# Patient Record
Sex: MALE | Race: WHITE | NOT HISPANIC OR LATINO | Employment: OTHER | URBAN - METROPOLITAN AREA
[De-identification: names, ages, dates, MRNs, and addresses within clinical notes are randomized per-mention and may not be internally consistent; named-entity substitution may affect disease eponyms.]

---

## 2017-06-19 NOTE — PATIENT DISCUSSION
The patient feels that the cataract is significantly impacting daily activities and has elected cataract surgery. The risks, benefits, and alternatives to surgery were discussed. The patient elects to proceed with surgery. Pt elects PCIOL OD Basic goal Louise.

## 2017-07-14 NOTE — PATIENT DISCUSSION
The patient is 1 day status post cataract surgery. The eye has healed well with no signs of infection or inflammation. All surgery associated drops may be stopped. A glasses prescription, if needed, was prescribed.

## 2017-07-14 NOTE — PATIENT DISCUSSION
The patient is 1 month status post cataract surgery. The eye has healed well with no signs of infection or inflammation. All surgery associated drops may be stopped. A glasses prescription, if needed, was prescribed.

## 2017-07-20 NOTE — PATIENT DISCUSSION
The patient is 1 wk status post cataract surgery. The eye has healed well with no signs of infection or inflammation. A glasses prescription, if needed at next visit.

## 2017-08-07 NOTE — PATIENT DISCUSSION
RD/tear warning symptoms reviewed. F&F brochure given. Call immediately if increase in floaters, flashes, veil, blur.

## 2017-11-30 NOTE — PROCEDURE NOTE: SURGICAL
Prior to commencing surgery patient identification, surgical procedure, site, and side were confirmed by Dr. Jorge Vila. Following topical proparacaine anesthesia, the patient was positioned at the YAG laser, a contact lens coupled to the cornea with methylcellulose and an axial posterior capsulotomy performed without complication using 3.3 Mj x 59. Excess methylcellulose was washed from the eye, one drop of Alphagan was instilled and the patient returned to the holding area having tolerated the procedure well and without complication. <br />

## 2018-05-24 NOTE — PROCEDURE NOTE: SURGICAL
Prior to commencing surgery patient identification, surgical procedure, site, and side were confirmed by Dr. Reddy Grace. Following topical proparacaine anesthesia, the patient was positioned at the YAG laser, a contact lens coupled to the cornea with methylcellulose and an axial posterior capsulotomy performed without complication using 3.8 Mj x 76. Excess methylcellulose was washed from the eye, one drop of Alphagan was instilled and the patient returned to the holding area having tolerated the procedure well and without complication. <br />[facility]:[id number]

## 2019-10-03 ENCOUNTER — NEW PATIENT COMPREHENSIVE (OUTPATIENT)
Dept: URBAN - METROPOLITAN AREA CLINIC 32 | Facility: CLINIC | Age: 79
End: 2019-10-03

## 2019-10-03 DIAGNOSIS — H25.013: ICD-10-CM

## 2019-10-03 DIAGNOSIS — H25.13: ICD-10-CM

## 2019-10-03 PROCEDURE — G9903 PT SCRN TBCO ID AS NON USER: HCPCS

## 2019-10-03 PROCEDURE — G8428 CUR MEDS NOT DOCUMENT: HCPCS

## 2019-10-03 PROCEDURE — 92015 DETERMINE REFRACTIVE STATE: CPT

## 2019-10-03 PROCEDURE — 92004 COMPRE OPH EXAM NEW PT 1/>: CPT

## 2019-10-03 PROCEDURE — G8482 FLU IMMUNIZE ORDER/ADMIN: HCPCS

## 2019-10-03 PROCEDURE — 4040F PNEUMOC VAC/ADMIN/RCVD: CPT

## 2019-10-03 PROCEDURE — G8785 BP SCRN NO PERF AT INTERVAL: HCPCS

## 2019-10-03 PROCEDURE — 1036F TOBACCO NON-USER: CPT

## 2019-10-03 ASSESSMENT — KERATOMETRY
OS_K2POWER_DIOPTERS: 43.75
OD_K2POWER_DIOPTERS: 44.5
OD_AXISANGLE2_DEGREES: 93
OS_AXISANGLE2_DEGREES: 50
OD_K1POWER_DIOPTERS: 43.25
OS_AXISANGLE_DEGREES: 140
OD_AXISANGLE_DEGREES: 3
OS_K1POWER_DIOPTERS: 43

## 2019-10-03 ASSESSMENT — VISUAL ACUITY
OS_SC: J16
OD_SC: 20/100
OS_SC: 20/80
OD_SC: J4
OD_BAT: 20/200
OS_BAT: 20/80

## 2019-10-03 ASSESSMENT — TONOMETRY
OD_IOP_MMHG: 13
OS_IOP_MMHG: 14

## 2019-10-25 ENCOUNTER — SURGICAL TESTING (OUTPATIENT)
Dept: URBAN - METROPOLITAN AREA CLINIC 32 | Facility: CLINIC | Age: 79
End: 2019-10-25

## 2019-10-25 DIAGNOSIS — H43.813: ICD-10-CM

## 2019-10-25 DIAGNOSIS — H02.831: ICD-10-CM

## 2019-10-25 DIAGNOSIS — H25.13: ICD-10-CM

## 2019-10-25 DIAGNOSIS — H02.834: ICD-10-CM

## 2019-10-25 PROCEDURE — G8428 CUR MEDS NOT DOCUMENT: HCPCS

## 2019-10-25 PROCEDURE — 92025-2 CORNEAL TOPOGRAPHY, PT

## 2019-10-25 PROCEDURE — 4040F PNEUMOC VAC/ADMIN/RCVD: CPT

## 2019-10-25 PROCEDURE — G8482 FLU IMMUNIZE ORDER/ADMIN: HCPCS

## 2019-10-25 PROCEDURE — 92134 CPTRZ OPH DX IMG PST SGM RTA: CPT

## 2019-10-25 PROCEDURE — G8785 BP SCRN NO PERF AT INTERVAL: HCPCS

## 2019-10-25 PROCEDURE — V2799PMN IMPRIMIS PRED-MOXI-NEPAF 5ML

## 2019-10-25 PROCEDURE — 92286 ANT SGM IMG I&R SPECLR MIC: CPT

## 2019-10-25 PROCEDURE — 92014 COMPRE OPH EXAM EST PT 1/>: CPT

## 2019-10-25 PROCEDURE — 1036F TOBACCO NON-USER: CPT

## 2019-10-25 PROCEDURE — G9903 PT SCRN TBCO ID AS NON USER: HCPCS

## 2019-10-25 PROCEDURE — 92136TC INTERFEROMETRY - TECHNICAL COMPONENT

## 2019-10-25 ASSESSMENT — KERATOMETRY
OS_K1POWER_DIOPTERS: 43
OD_K2POWER_DIOPTERS: 44.5
OD_AXISANGLE2_DEGREES: 93
OS_AXISANGLE2_DEGREES: 50
OS_K2POWER_DIOPTERS: 43.75
OD_K1POWER_DIOPTERS: 43.25
OD_AXISANGLE_DEGREES: 3
OS_AXISANGLE_DEGREES: 140

## 2019-10-25 ASSESSMENT — VISUAL ACUITY
OS_SC: 20/80
OS_BAT: 20/80
OD_CC: 20/50
OS_CC: 20/40
OD_CC: J3
OD_SC: 20/100
OS_SC: J16
OD_BAT: 20/200
OS_CC: J1
OD_SC: J4

## 2019-10-25 ASSESSMENT — TONOMETRY
OS_IOP_MMHG: 15
OD_IOP_MMHG: 18

## 2019-11-05 ENCOUNTER — SURGERY/PROCEDURE (OUTPATIENT)
Dept: URBAN - METROPOLITAN AREA CLINIC 32 | Facility: CLINIC | Age: 79
End: 2019-11-05

## 2019-11-05 DIAGNOSIS — H25.811: ICD-10-CM

## 2019-11-05 PROCEDURE — 66984CV REMOVE CATARACT, INSERT LENS, CUSTOM VISION

## 2019-11-05 PROCEDURE — S9986T TECHNOLOGY PKG - PF

## 2019-11-06 ENCOUNTER — CATARACT POST-OP 1-DAY (OUTPATIENT)
Dept: URBAN - METROPOLITAN AREA CLINIC 32 | Facility: CLINIC | Age: 79
End: 2019-11-06

## 2019-11-06 DIAGNOSIS — Z96.1: ICD-10-CM

## 2019-11-06 PROCEDURE — 99024 POSTOP FOLLOW-UP VISIT: CPT

## 2019-11-06 ASSESSMENT — KERATOMETRY
OD_K2POWER_DIOPTERS: 44.5
OD_K1POWER_DIOPTERS: 43.25
OS_AXISANGLE2_DEGREES: 50
OS_K1POWER_DIOPTERS: 43
OD_AXISANGLE_DEGREES: 3
OD_AXISANGLE2_DEGREES: 93
OS_K2POWER_DIOPTERS: 43.75
OS_AXISANGLE_DEGREES: 140

## 2019-11-06 ASSESSMENT — VISUAL ACUITY: OD_SC: 20/40

## 2019-11-06 ASSESSMENT — TONOMETRY: OD_IOP_MMHG: 22

## 2019-11-07 ASSESSMENT — KERATOMETRY
OD_AXISANGLE2_DEGREES: 93
OS_AXISANGLE_DEGREES: 140
OS_AXISANGLE2_DEGREES: 50
OD_K2POWER_DIOPTERS: 44.5
OD_AXISANGLE_DEGREES: 3
OS_K2POWER_DIOPTERS: 43.75
OS_K1POWER_DIOPTERS: 43
OD_K1POWER_DIOPTERS: 43.25

## 2019-11-12 ENCOUNTER — POST-OP CATARACT (OUTPATIENT)
Dept: URBAN - METROPOLITAN AREA CLINIC 32 | Facility: CLINIC | Age: 79
End: 2019-11-12

## 2019-11-12 DIAGNOSIS — Z96.1: ICD-10-CM

## 2019-11-12 DIAGNOSIS — H25.12: ICD-10-CM

## 2019-11-12 PROCEDURE — 92012 INTRM OPH EXAM EST PATIENT: CPT

## 2019-11-12 PROCEDURE — G8428 CUR MEDS NOT DOCUMENT: HCPCS

## 2019-11-12 PROCEDURE — G8482 FLU IMMUNIZE ORDER/ADMIN: HCPCS

## 2019-11-12 PROCEDURE — 4040F PNEUMOC VAC/ADMIN/RCVD: CPT

## 2019-11-12 PROCEDURE — 1036F TOBACCO NON-USER: CPT

## 2019-11-12 PROCEDURE — G8785 BP SCRN NO PERF AT INTERVAL: HCPCS

## 2019-11-12 PROCEDURE — G9903 PT SCRN TBCO ID AS NON USER: HCPCS

## 2019-11-12 ASSESSMENT — KERATOMETRY
OS_K1POWER_DIOPTERS: 43
OD_K2POWER_DIOPTERS: 44.5
OD_K1POWER_DIOPTERS: 43.25
OD_AXISANGLE_DEGREES: 3
OD_AXISANGLE2_DEGREES: 93
OS_AXISANGLE2_DEGREES: 50
OS_K2POWER_DIOPTERS: 43.75
OS_AXISANGLE_DEGREES: 140

## 2019-11-12 ASSESSMENT — VISUAL ACUITY: OD_SC: 20/20-2

## 2019-11-12 ASSESSMENT — TONOMETRY
OD_IOP_MMHG: 17
OS_IOP_MMHG: 11

## 2019-11-19 ENCOUNTER — SURGERY/PROCEDURE (OUTPATIENT)
Dept: URBAN - METROPOLITAN AREA CLINIC 32 | Facility: CLINIC | Age: 79
End: 2019-11-19

## 2019-11-19 DIAGNOSIS — H25.812: ICD-10-CM

## 2019-11-19 PROCEDURE — S9986T TECHNOLOGY PKG - PF

## 2019-11-19 PROCEDURE — 66984CV REMOVE CATARACT, INSERT LENS, CUSTOM VISION

## 2019-11-19 ASSESSMENT — KERATOMETRY
OD_K2POWER_DIOPTERS: 44.5
OS_K1POWER_DIOPTERS: 43
OD_AXISANGLE_DEGREES: 3
OS_AXISANGLE_DEGREES: 140
OS_AXISANGLE2_DEGREES: 50
OD_AXISANGLE2_DEGREES: 93
OS_K2POWER_DIOPTERS: 43.75
OD_K1POWER_DIOPTERS: 43.25

## 2019-11-20 ENCOUNTER — CATARACT POST-OP 1-DAY (OUTPATIENT)
Dept: URBAN - METROPOLITAN AREA CLINIC 32 | Facility: CLINIC | Age: 79
End: 2019-11-20

## 2019-11-20 DIAGNOSIS — Z96.1: ICD-10-CM

## 2019-11-20 PROCEDURE — G8482 FLU IMMUNIZE ORDER/ADMIN: HCPCS

## 2019-11-20 PROCEDURE — G9903 PT SCRN TBCO ID AS NON USER: HCPCS

## 2019-11-20 PROCEDURE — G8428 CUR MEDS NOT DOCUMENT: HCPCS

## 2019-11-20 PROCEDURE — G8785 BP SCRN NO PERF AT INTERVAL: HCPCS

## 2019-11-20 PROCEDURE — 4040F PNEUMOC VAC/ADMIN/RCVD: CPT

## 2019-11-20 PROCEDURE — 1036F TOBACCO NON-USER: CPT

## 2019-11-20 ASSESSMENT — VISUAL ACUITY
OS_PH: 20/60-1
OD_SC: 20/25+2
OS_SC: 20/200

## 2019-11-20 ASSESSMENT — KERATOMETRY
OS_AXISANGLE_DEGREES: 140
OS_K1POWER_DIOPTERS: 43
OS_AXISANGLE2_DEGREES: 50
OD_AXISANGLE2_DEGREES: 93
OD_AXISANGLE_DEGREES: 3
OS_K2POWER_DIOPTERS: 43.75
OD_K1POWER_DIOPTERS: 43.25
OD_K2POWER_DIOPTERS: 44.5

## 2019-11-20 ASSESSMENT — TONOMETRY: OS_IOP_MMHG: 17

## 2019-11-26 ENCOUNTER — POST-OP CATARACT (OUTPATIENT)
Dept: URBAN - METROPOLITAN AREA CLINIC 32 | Facility: CLINIC | Age: 79
End: 2019-11-26

## 2019-11-26 DIAGNOSIS — Z96.1: ICD-10-CM

## 2019-11-26 PROCEDURE — 99024 POSTOP FOLLOW-UP VISIT: CPT

## 2019-11-26 ASSESSMENT — TONOMETRY
OD_IOP_MMHG: 22
OS_IOP_MMHG: 27
OD_IOP_MMHG: 22
OD_IOP_MMHG: 20
OS_IOP_MMHG: 23

## 2019-11-26 ASSESSMENT — VISUAL ACUITY
OD_SC: 20/20
OU_SC: 20/20
OU_SC: J1+
OS_PH: 20/30-2
OD_SC: J5-2
OS_SC: J1+2
OS_SC: 20/100+1

## 2019-11-26 ASSESSMENT — KERATOMETRY
OD_AXISANGLE_DEGREES: 3
OS_K1POWER_DIOPTERS: 43
OS_AXISANGLE2_DEGREES: 50
OD_K1POWER_DIOPTERS: 43.25
OD_K2POWER_DIOPTERS: 44.5
OS_AXISANGLE_DEGREES: 140
OD_AXISANGLE2_DEGREES: 93
OS_K2POWER_DIOPTERS: 43.75

## 2020-01-14 ENCOUNTER — POST-OP CATARACT (OUTPATIENT)
Dept: URBAN - METROPOLITAN AREA CLINIC 32 | Facility: CLINIC | Age: 80
End: 2020-01-14

## 2020-01-14 DIAGNOSIS — Z96.1: ICD-10-CM

## 2020-01-14 PROCEDURE — 99024 POSTOP FOLLOW-UP VISIT: CPT

## 2020-01-14 ASSESSMENT — KERATOMETRY
OD_K2POWER_DIOPTERS: 43.25
OD_K2POWER_DIOPTERS: 44.5
OD_AXISANGLE_DEGREES: 26
OS_AXISANGLE_DEGREES: 140
OS_AXISANGLE2_DEGREES: 40
OS_AXISANGLE2_DEGREES: 50
OD_AXISANGLE2_DEGREES: 93
OS_AXISANGLE_DEGREES: 130
OD_AXISANGLE2_DEGREES: 116
OD_K1POWER_DIOPTERS: 42.5
OS_K2POWER_DIOPTERS: 43.25
OD_K1POWER_DIOPTERS: 43.25
OD_AXISANGLE_DEGREES: 3
OS_K2POWER_DIOPTERS: 43.75
OS_K1POWER_DIOPTERS: 43

## 2020-01-14 ASSESSMENT — TONOMETRY
OD_IOP_MMHG: 11
OS_IOP_MMHG: 11

## 2020-01-14 ASSESSMENT — VISUAL ACUITY
OD_SC: J7
OS_SC: J2
OD_SC: 20/25
OS_SC: 20/200

## 2022-06-04 ENCOUNTER — TELEPHONE ENCOUNTER (OUTPATIENT)
Dept: URBAN - METROPOLITAN AREA CLINIC 68 | Facility: CLINIC | Age: 82
End: 2022-06-04

## 2022-06-04 RX ORDER — CELECOXIB 200 MG/1
CAPSULE ORAL PRN
OUTPATIENT
Start: 2011-03-15 | End: 2020-01-16

## 2022-06-04 RX ORDER — SODIUM SULFATE, POTASSIUM SULFATE, MAGNESIUM SULFATE 17.5; 3.13; 1.6 G/ML; G/ML; G/ML
SOLUTION, CONCENTRATE ORAL AS DIRECTED
Qty: 1 | Refills: 0 | OUTPATIENT
Start: 2013-12-13 | End: 2020-01-16

## 2022-06-04 RX ORDER — POLYETHYLENE GLYCOL 3350, SODIUM SULFATE, SODIUM CHLORIDE, POTASSIUM CHLORIDE, ASCORBIC ACID, SODIUM ASCORBATE 140-9-5.2G
KIT ORAL AS DIRECTED
Qty: 1 | Refills: 0 | OUTPATIENT
Start: 2020-01-16 | End: 2020-01-17

## 2022-06-04 RX ORDER — LEVOTHYROXINE SODIUM 75 UG/1
TABLET ORAL
OUTPATIENT
Start: 2005-04-12 | End: 2013-12-13

## 2022-06-05 ENCOUNTER — TELEPHONE ENCOUNTER (OUTPATIENT)
Dept: URBAN - METROPOLITAN AREA CLINIC 68 | Facility: CLINIC | Age: 82
End: 2022-06-05

## 2022-06-05 RX ORDER — METOPROLOL TARTRATE 25 MG/1
METOPROLOL TARTRATE( 25MG ORAL 1/2 DAILY ) ACTIVE -HX ENTRY TABLET, FILM COATED ORAL DAILY
Status: ACTIVE | COMMUNITY
Start: 2020-01-16

## 2022-06-05 RX ORDER — ROSUVASTATIN CALCIUM 5 MG/1
ROSUVASTATIN CALCIUM( 5MG ORAL 1 THREE TIMES A WEEK ) ACTIVE -HX ENTRY TABLET, FILM COATED ORAL
Status: ACTIVE | COMMUNITY
Start: 2020-01-16

## 2022-06-05 RX ORDER — LIOTHYRONINE SODIUM 5 UG/1
LIOTHYRONINE SODIUM( 5MCG ORAL 1 DAILY ) ACTIVE -HX ENTRY TABLET ORAL DAILY
Status: ACTIVE | COMMUNITY
Start: 2020-01-16

## 2022-06-05 RX ORDER — LEVOTHYROXINE SODIUM 100 UG/1
LEVOTHYROXINE SODIUM( 100MCG ORAL  DAILY ) ACTIVE -HX ENTRY TABLET ORAL DAILY
Status: ACTIVE | COMMUNITY
Start: 2020-01-16

## 2022-06-25 ENCOUNTER — TELEPHONE ENCOUNTER (OUTPATIENT)
Age: 82
End: 2022-06-25

## 2022-06-25 RX ORDER — SODIUM SULFATE, POTASSIUM SULFATE, MAGNESIUM SULFATE 17.5; 3.13; 1.6 G/ML; G/ML; G/ML
SOLUTION, CONCENTRATE ORAL AS DIRECTED
Qty: 1 | Refills: 0 | OUTPATIENT
Start: 2013-12-13 | End: 2020-01-16

## 2022-06-25 RX ORDER — CELECOXIB 200 MG
CAPSULE ORAL PRN
OUTPATIENT
Start: 2011-03-15 | End: 2020-01-16

## 2022-06-25 RX ORDER — POLYETHYLENE GLYCOL 3350, SODIUM SULFATE, SODIUM CHLORIDE, POTASSIUM CHLORIDE, ASCORBIC ACID, SODIUM ASCORBATE 140-9-5.2G
KIT ORAL AS DIRECTED
Qty: 1 | Refills: 0 | OUTPATIENT
Start: 2020-01-16 | End: 2020-01-17

## 2022-06-25 RX ORDER — LEVOTHYROXINE SODIUM 75 UG/1
TABLET ORAL
OUTPATIENT
Start: 2005-04-12 | End: 2013-12-13

## 2022-06-26 ENCOUNTER — TELEPHONE ENCOUNTER (OUTPATIENT)
Age: 82
End: 2022-06-26

## 2022-06-26 RX ORDER — LIOTHYRONINE SODIUM 5 UG/1
LIOTHYRONINE SODIUM( 5MCG ORAL 1 DAILY ) ACTIVE -HX ENTRY TABLET ORAL DAILY
Status: ACTIVE | COMMUNITY
Start: 2020-01-16

## 2022-06-26 RX ORDER — LEVOTHYROXINE SODIUM 100 UG/1
LEVOTHYROXINE SODIUM( 100MCG ORAL  DAILY ) ACTIVE -HX ENTRY TABLET ORAL DAILY
Status: ACTIVE | COMMUNITY
Start: 2020-01-16

## 2022-06-26 RX ORDER — METOPROLOL TARTRATE 25 MG/1
METOPROLOL TARTRATE( 25MG ORAL 1/2 DAILY ) ACTIVE -HX ENTRY TABLET, FILM COATED ORAL DAILY
Status: ACTIVE | COMMUNITY
Start: 2020-01-16

## 2022-06-26 RX ORDER — ROSUVASTATIN CALCIUM 5 MG/1
ROSUVASTATIN CALCIUM( 5MG ORAL 1 THREE TIMES A WEEK ) ACTIVE -HX ENTRY TABLET, FILM COATED ORAL
Status: ACTIVE | COMMUNITY
Start: 2020-01-16

## 2025-06-30 NOTE — PATIENT DISCUSSION
Indications, risks, benefits and alternatives to YAG capsulotomy discussed with patient. Questions answered. Educational handout given. Statement Selected